# Patient Record
(demographics unavailable — no encounter records)

---

## 2024-10-28 NOTE — HISTORY OF PRESENT ILLNESS
[FreeTextEntry1] : 67-year-old male found to have a kidney stone.  This is his first stone he has no urinary symptoms and no pain. CT imaging reviewed demonstrating a 12 mm left lower pole stone.   Cr (10/2024) - 1.22

## 2024-10-28 NOTE — ASSESSMENT
[FreeTextEntry1] : 67-year-old male with a 12 mm left lower pole stone.  We discussed options regarding the stone including surveillance versus definitive stone management.  He states that he has had prior imaging showing the stone has grown about 8 mm in the past year.  He would like to move forward with definitive stone management with ureteroscopy.  Discussed options for management of the patient's ureteral stone.  We discussed surgical options including ureteroscopy and shock wave lithotripsy.  In addition we discussed conservative management with medical expulsive therapy.  The patient has elected to undergo ureteroscopy.  I discussed with the patients risks and benefits and alternatives to ureteroscopy with laser lithotripsy.  The risks include bleeding, infection, injury to the urethra, bladder, ureter or kidney, risk of a staged procedure, risk of stricture, risk of residual fragments, risk of loss of kidney, risks of anesthesia including DVT, PE, MI and death.  I also discussed with the patient the possibility of having a ureteral stent placed.  We discussed stent related symptoms and probable duration of stent placement.  The patient was counseled that the stent is temporary and will be removed either cystoscopically in the office or by external string.The patient understands these risks and wishes to proceed with ureteroscopy.  Plan I reviewed images of patient CT abdomen pelvis and discussed results with the patient demonstrating a 12 mm left lower pole stone Urinalysis Urine culture Schedule for left ureteroscopy and laser lithotripsy Patient will need medical clearance

## 2025-01-03 NOTE — ASSESSMENT
[FreeTextEntry1] : 67-year-old male with a 12 mm left lower pole stone sp left ureteroscopy.  His renal ultrasound today did not demonstrate any residual stones.  We discussed dietary modifications for stone prevention given this is his first stone including increased fluid intake, decrease animal protein of all kind, increase citrus fruit, daily recommended levels of calcium and sodium.  Plan  I have reviewed images of patient's renal us and discussed results with patient demonstrating no hydronephrosis or stones Dietary modifications for stone prevention reviewed Follow-up in 1 year for renal ultrasound or sooner if stone pain returns   Patient is being seen today for evaluation and management of a chronic and longitudinal ongoing condition and I am of the primary treating physician.

## 2025-01-03 NOTE — HISTORY OF PRESENT ILLNESS
[FreeTextEntry1] : 67-year-old male found to have a kidney stone.  This is his first stone he has no urinary symptoms and no pain. CT imaging reviewed demonstrating a 12 mm left lower pole stone he underwent ureteroscopy and stone extraction  Renal ultrasound done today negative for hydro and stones.